# Patient Record
Sex: MALE | Race: WHITE | URBAN - METROPOLITAN AREA
[De-identification: names, ages, dates, MRNs, and addresses within clinical notes are randomized per-mention and may not be internally consistent; named-entity substitution may affect disease eponyms.]

---

## 2022-09-15 ENCOUNTER — TELEPHONE (OUTPATIENT)
Dept: ORTHOPEDIC SURGERY | Age: 52
End: 2022-09-15

## 2022-09-15 NOTE — TELEPHONE ENCOUNTER
May this patient  be  scheduled with  someone   He was getting  injections  in  [place]   3 years ago and  recently  moved to  [place] . There is  a referral in chart. No  new  films      Pt  not  sure  what he  needs  now. He  has  requested the  records  come to us  by  fax  but nothing  has come in. May  we  schedule  with  either  DIANA/or  MALGORZATA   for  workup  Or either with  [facility] / PM  without  waiting  further for  records?

## 2022-10-17 ENCOUNTER — OFFICE VISIT (OUTPATIENT)
Dept: ORTHOPEDIC SURGERY | Age: 52
End: 2022-10-17
Payer: COMMERCIAL

## 2022-10-17 DIAGNOSIS — M54.16 LUMBAR RADICULOPATHY: ICD-10-CM

## 2022-10-17 DIAGNOSIS — M47.816 SPONDYLOSIS OF LUMBAR REGION WITHOUT MYELOPATHY OR RADICULOPATHY: ICD-10-CM

## 2022-10-17 DIAGNOSIS — M54.50 LUMBAR BACK PAIN: Primary | ICD-10-CM

## 2022-10-17 PROCEDURE — 99204 OFFICE O/P NEW MOD 45 MIN: CPT | Performed by: PHYSICAL MEDICINE & REHABILITATION

## 2022-10-17 PROCEDURE — 20552 NJX 1/MLT TRIGGER POINT 1/2: CPT | Performed by: PHYSICAL MEDICINE & REHABILITATION

## 2022-10-17 RX ORDER — TRIAMCINOLONE ACETONIDE 40 MG/ML
80 INJECTION, SUSPENSION INTRA-ARTICULAR; INTRAMUSCULAR ONCE
Status: COMPLETED | OUTPATIENT
Start: 2022-10-17 | End: 2022-10-17

## 2022-10-17 RX ADMIN — TRIAMCINOLONE ACETONIDE 80 MG: 40 INJECTION, SUSPENSION INTRA-ARTICULAR; INTRAMUSCULAR at 09:17

## 2022-11-21 NOTE — PROGRESS NOTES
Date:  11/21/22     HPI:  I am seeing Karen Mike in evalution/folowup. New patient appointment total time spent with patient approximately 45 minutes, this to include review of over 21 pages of old records, full discussion of symptomatology. He has had problems for over a year, attributes some of this to increased running. We noticed lumbar spine pain with running, severe that of all of the left anterior and lateral leg area also groin pain. He underwent physical therapy with type of injection also lumbar spine exercise everything got better. He had MR imaging that revealed a disc protrusion at the L3-L4 level. He has pain in the left lower lumbar paraspinal area, currently dull. Worse with lifting things or cycling. Is better with sitting, standing, lying down and stretching. Hip flexion may help somewhat. He denies any neurologic issues in the lower extremities. Takes ibuprofen and naproxen as need be. Again he continues with his lumbar spine exercises he has been doing for about 3 years. He has had a more recent episode about 4 weeks ago that lasted several days and was a bit better if he flexed his leg. That is gotten somewhat better. When the pain was more noted was 8/10 in severity. He had trouble standing, walking, going up and down stairs. Trouble bathing, cleaning, dressing, sleeping. He did receive a spine injection in the past that he tells me offered an 80% pain reduction for over 6 months. No past medical history on file. No past surgical history on file. No family history on file.      Social History     Socioeconomic History    Marital status:      Spouse name: Not on file    Number of children: Not on file    Years of education: Not on file    Highest education level: Not on file   Occupational History    Not on file   Tobacco Use    Smoking status: Not on file    Smokeless tobacco: Not on file   Substance and Sexual Activity    Alcohol use: Not on file Drug use: Not on file    Sexual activity: Not on file   Other Topics Concern    Not on file   Social History Narrative    Not on file     Social Determinants of Health     Financial Resource Strain: Not on file   Food Insecurity: Not on file   Transportation Needs: Not on file   Physical Activity: Not on file   Stress: Not on file   Social Connections: Not on file   Intimate Partner Violence: Not on file   Housing Stability: Not on file        Review of Systems       No current outpatient medications on file. No current facility-administered medications for this visit. Not on File           PHYSICAL EXAM    General: Alert and cooperative    HEENT: Clear speech    Motor Exam: Good strength    Sensory Exam: No lateralizing findings    Deep Tendon Reflexes: Decreased reflexes in LE's    Cerebellar Exam: No ataxia in the Ue's    Extremities: Deferred    Gait / Station: Ambulates without assistance    Lumbar Spine: Tenderness to left lower lumbar paraspinal area. No significant buttock tenderness. IMPRESSION/PLAN:   Currently musculoskeletal lumbosacral spine pain. Has some radicular symptoms in the past but now the pain is more noted in the lower lumbar paraspinal area. Could represent facet joint arthropathy. I agree there is not a spine surgical issue at this time and he could benefit from a trigger point injection into the involved area. If no improvement from that, could consider a fluoroscopically guided facet joint injection. Continue his provider directed lumbar spine exercises that he has been doing for several years, it appears. On my schedule yearly follow-up for continuity of care so we can intervene as need dictates in the future. Trigger Point Injection(s):  After informed oral consent, patient was prepped per routine. The left lumbar paraspinal area was injected. 80 mg of Kenalog with 1 cc of 0.25% Marcaine injected at that site. Patient tolerated well. Band aid(s) applied. A total of one muscle/site injected today for trigger point charge.         Sarah Dial MD

## 2023-01-30 ENCOUNTER — OFFICE VISIT (OUTPATIENT)
Dept: ORTHOPEDIC SURGERY | Age: 53
End: 2023-01-30
Payer: COMMERCIAL

## 2023-01-30 DIAGNOSIS — M54.50 LUMBAR BACK PAIN: ICD-10-CM

## 2023-01-30 DIAGNOSIS — M54.16 LUMBAR RADICULOPATHY: Primary | ICD-10-CM

## 2023-01-30 DIAGNOSIS — M47.816 SPONDYLOSIS OF LUMBAR REGION WITHOUT MYELOPATHY OR RADICULOPATHY: ICD-10-CM

## 2023-01-30 PROCEDURE — 99214 OFFICE O/P EST MOD 30 MIN: CPT | Performed by: PHYSICAL MEDICINE & REHABILITATION

## 2023-01-30 NOTE — PROGRESS NOTES
Date:  01/30/23     HPI:  I am seeing Henry Israel in evalution/folowup. This visit of approximately 30 minutes. I saw him most recently just over 2 months ago. He had problems for over a year, due to possible increased exercise. He also has a cyclist.  He had pain in the left anterior and lateral leg area but also the groin. He underwent physical therapy and things got better. MR imaging of lumbar spine has revealed a disc protrusion at the L3-L4 level. I believe this was several years ago as he tells me. When I saw him last visit he was having pain in the left lower lumbar paraspinal area for which he underwent trigger point injections. Those of had some benefit and he feels that he is doing somewhat better but still has some difficulties in the morning and when it gets more noted it affects his ability to exercise and be very active during the day. He tells me that if he could cut back his level activity he would do better but that some he does not feel he should have to do and I would tend to agree. His current pain scale gets up to 8/10. Causes trouble exercises, running, etc.  He has been doing lumbar spine exercises for years. He also has trouble bathing, cleaning, dressing, sleeping. When he received spine injections in the past 1 injection offered 80% pain reduction for over 6 months. ROS: As above    PE: Cooperative. Good strength lower extremities. No lateralizing sensory findings. Has tenderness in the lower lumbar paraspinal areas. Good range of motion of the hips. Ambulates without assistance    Assessment/Plan:  PREDOMINANTLY MUSCULOSKELETAL LUMBOSACRAL  SPINE PAIN. A lot of the symptoms I think are from left lower lumbar facet arthropathy but does have some pseudo radicular symptoms in either an L3-L4 L4-L5 distributional.  I do not think the left hip is the main problem we can always look further into that pain why does with further intervention geared toward the lumbar spine. I recommend he continue his current efforts and we will have him come in for left L3 and L4 selective nerve root blocks with trigger point injections (2 sites) in the left lower lumbar paraspinal area. We do not need to reimage but we will reevaluate as we go. If all else fails we could consider spine surgical consultation and that would prompt repeating an MRI but again nothing felt required at this time.     Arturo Morales MD

## 2023-02-14 ENCOUNTER — OFFICE VISIT (OUTPATIENT)
Dept: ORTHOPEDIC SURGERY | Age: 53
End: 2023-02-14

## 2023-02-14 DIAGNOSIS — M54.16 LUMBAR RADICULOPATHY: Primary | ICD-10-CM

## 2023-02-14 DIAGNOSIS — M54.50 LUMBAR BACK PAIN: ICD-10-CM

## 2023-02-14 RX ORDER — TRIAMCINOLONE ACETONIDE 40 MG/ML
280 INJECTION, SUSPENSION INTRA-ARTICULAR; INTRAMUSCULAR ONCE
Status: COMPLETED | OUTPATIENT
Start: 2023-02-14 | End: 2023-02-14

## 2023-02-14 RX ADMIN — TRIAMCINOLONE ACETONIDE 280 MG: 40 INJECTION, SUSPENSION INTRA-ARTICULAR; INTRAMUSCULAR at 11:01

## 2023-02-14 NOTE — PROGRESS NOTES
Date: 02/14/23   Name: Stephen Carbajal    Pre-Procedural Diagnosis:    Diagnosis Orders   1. Lumbar radiculopathy  FL NERVE BLOCK LUMBOSACRAL 1ST    FL NERVE BLOCK LUMBOSACRAL EACH ADD    triamcinolone acetonide (KENALOG-40) injection 280 mg      2. Lumbar back pain  INJECT TRIGGER POINT, 1 OR 2    triamcinolone acetonide (KENALOG-40) injection 280 mg          Procedure: Selective Nerve Root Blocks (Transforaminal) - Multiple Level with lumbar trigger point injections    Precautions:  [facility] Precautions spine injections: None. Patient denies any prior sensitivity to steroid, local anesthetic, contrast dye, iodine or shellfish. The procedure was discussed at length with the patient and informed consent was signed. The patient was placed in a prone position on the fluoroscopy table and the skin was prepped and draped in a routine sterile fashion. The areas to be injected were each anesthetized with approximately 5 cc of 1% Lidocaine. A 22-gauge 3.5 inch inch spinal needle was carefully advanced under fluoroscopic guidance to the left L3 transforaminal space and subsequently the left  L4 transforaminal space. At this time 0.25 cc of omnipaque administered. . Once proper placement was confirmed, 2 cc of 0.25% Marcaine and 80 mg of Kenalog were injected through the spinal needle at each site. After informed oral consent, patient was prepped per routine. The left lower lumbar paraspinal area (2 sites) were injected. 60 mg of Kenalog with 1 cc of 0.25% Marcaine injected at each site. Patient tolerated well. Band aid(s) applied. Fluoroscopic guidance was used intermittently over a 10-minute period to insure proper needle placement and patient safety. A hard copy of the fluoroscopic  images has been placed in the patient's chart. The patient was monitored after the procedure and discharged home in stable fashion. A total of two muscles/sites injected today for trigger point charge.     Resume Meds:    N/A    L MENDOZA Tigist Shell MD  02/14/23

## 2023-03-21 ENCOUNTER — HOSPITAL ENCOUNTER (OUTPATIENT)
Dept: CT IMAGING | Age: 53
Discharge: HOME | End: 2023-03-24

## 2023-03-21 DIAGNOSIS — Z13.6 SCREENING FOR ISCHEMIC HEART DISEASE: ICD-10-CM

## 2023-03-21 PROCEDURE — 75571 CT HRT W/O DYE W/CA TEST: CPT

## 2023-11-02 ENCOUNTER — OFFICE VISIT (OUTPATIENT)
Dept: ORTHOPEDIC SURGERY | Age: 53
End: 2023-11-02
Payer: COMMERCIAL

## 2023-11-02 DIAGNOSIS — M54.16 LUMBAR RADICULOPATHY: ICD-10-CM

## 2023-11-02 DIAGNOSIS — M54.50 LUMBAR BACK PAIN: ICD-10-CM

## 2023-11-02 DIAGNOSIS — M47.816 SPONDYLOSIS OF LUMBAR REGION WITHOUT MYELOPATHY OR RADICULOPATHY: Primary | ICD-10-CM

## 2023-11-02 PROCEDURE — 99214 OFFICE O/P EST MOD 30 MIN: CPT | Performed by: PHYSICAL MEDICINE & REHABILITATION

## 2023-11-02 NOTE — PROGRESS NOTES
symptomatology. His radicular symptoms into the left leg are doing pretty well right now nothing further needs to be done, least with regards to selective nerve root blocks. 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 This is a longstanding issue for which nothing really can be done for it and we will just follow this along. I think he could benefit from bilateral lumbar facet joint injections that could offer therapeutic diagnostic benefit. If he does well with those that could raise the possibility of pursuing an RFA procedure.          Lamar Hooper MD

## 2023-11-16 ENCOUNTER — OFFICE VISIT (OUTPATIENT)
Dept: ORTHOPEDIC SURGERY | Age: 53
End: 2023-11-16

## 2023-11-16 DIAGNOSIS — M47.816 SPONDYLOSIS OF LUMBAR REGION WITHOUT MYELOPATHY OR RADICULOPATHY: Primary | ICD-10-CM

## 2023-11-16 RX ORDER — TRIAMCINOLONE ACETONIDE 40 MG/ML
200 INJECTION, SUSPENSION INTRA-ARTICULAR; INTRAMUSCULAR ONCE
Status: COMPLETED | OUTPATIENT
Start: 2023-11-16 | End: 2023-11-16

## 2023-11-16 RX ADMIN — TRIAMCINOLONE ACETONIDE 200 MG: 40 INJECTION, SUSPENSION INTRA-ARTICULAR; INTRAMUSCULAR at 13:42

## 2023-11-16 NOTE — PROGRESS NOTES
Date: 11/16/23   Name: Heather Scott    Pre-Procedural Diagnosis:    Diagnosis Orders   1. Spondylosis of lumbar region without myelopathy or radiculopathy  FL INJ LUMB/SAC FACET SINGLE LEVEL    XR INJ FACET LUMB SACRAL 2ND LVL    triamcinolone acetonide (KENALOG-40) injection 200 mg          Procedure: Bilateral Facet Joint Injections (2 levels)    Precautions:  [facility] Precautions spine injections: None. Patient denies any prior sensitivity to steroid, local anesthetic, contrast dye, iodine or shellfish. The procedure was discussed at length with the patient and informed consent was signed. The patient was placed in a prone position on the fluoroscopy table and the skin was prepped and draped in a routine sterile fashion. The areas to be injected were each anesthetized with approximately 2-3 cc of 1% Lidocaine. Initially a 22-gauge 3.5 inch spinal needle was carefully advanced under fluoroscopic guidance to the bilateral L4-L5 and L5-S1 facet joint spaces. 1 cc of 0.25% Marcaine and 50 mg of Kenalog were injected through the spinal needle at each site. Fluoroscopic guidance was used intermittently over a 10-minute period to insure proper needle placement and patient safety. A hard copy of the fluoroscopic  images has been placed in the patient's chart. The patient was monitored after the procedure and discharged home without complication. A total of 5 cc of Kenalog were administered during this procedure.     Resume Meds:     N/A    Nigel Bradford MD  11/16/23

## 2024-01-29 ENCOUNTER — TELEPHONE (OUTPATIENT)
Dept: ORTHOPEDIC SURGERY | Age: 54
End: 2024-01-29

## 2024-01-29 NOTE — TELEPHONE ENCOUNTER
The last inj did not help him. [facility] told him if it didn't that he would refer him for a surgical consultation. He would like to go ahead with this. Please let him know who [facility] refers him to.

## 2024-01-30 NOTE — TELEPHONE ENCOUNTER
Called patient  Dr Post not in the office today. Will forward message to Dr Post and call back once Dr Post has reviewed. Patient voiced appreciation and understanding.

## 2024-02-01 DIAGNOSIS — M47.816 SPONDYLOSIS OF LUMBAR REGION WITHOUT MYELOPATHY OR RADICULOPATHY: ICD-10-CM

## 2024-02-01 DIAGNOSIS — M54.50 LUMBAR BACK PAIN: Primary | ICD-10-CM

## 2024-02-01 DIAGNOSIS — M54.16 LUMBAR RADICULOPATHY: ICD-10-CM

## 2024-02-14 ENCOUNTER — TELEPHONE (OUTPATIENT)
Dept: ORTHOPEDIC SURGERY | Age: 54
End: 2024-02-14

## 2024-02-15 ENCOUNTER — TELEPHONE (OUTPATIENT)
Dept: ORTHOPEDIC SURGERY | Age: 54
End: 2024-02-15

## 2024-02-15 NOTE — TELEPHONE ENCOUNTER
Radiology Post-Procedure Note    Pre Op Diagnosis: Abnormal LFTs    Post Op Diagnosis: Same    Procedure: Ultrasound guided liver biopsy    Procedure performed by: Carmita Barfield MD    Written Informed Consent Obtained: Yes    Specimen Removed: Yes: Two 16 gauge core biopsy of liver    Estimated Blood Loss: Minimal    Findings: Moderate sedation and local anesthesia were used.    A 16-gauge Monopty biopsy device was used to remove 2 random right hepatic lobe specimen.  This specimen was sent to pathology for further evaluation.      The patient tolerated the procedure well and there were no complications.  Please see Imaging report for further details.      Scott Marmolejo  Radiology         Reviewed MRI lumbar spine results with patient.  Clinically he has lumbosacral spine pain and is interested in spine surgical opinion to see if any other options are available.  This study does reveal facet arthropathy, disc disease, protruding disc with some moderate left lateral recess stenosis.  There is what is felt to be a 0.9 cm cyst in the right kidney, the radiology not feeling thing further need to be done to follow-up on this.  Reviewed results with patient.  I told him that he does have degenerative changes, no significant impingement or other pathology but we can definitely proceed with a spine surgical consultation to see what other options may be available to him.  With regards to the cyst in the right kidney, appears to be nothing more than that and the radiology does not feel any further testing was required.  If the patient is concerned about this I could perform an ultrasound study.  He will think about this.

## 2024-02-16 ENCOUNTER — TELEPHONE (OUTPATIENT)
Dept: ORTHOPEDIC SURGERY | Age: 54
End: 2024-02-16

## 2024-02-26 ENCOUNTER — OFFICE VISIT (OUTPATIENT)
Dept: ORTHOPEDIC SURGERY | Age: 54
End: 2024-02-26
Payer: COMMERCIAL

## 2024-02-26 VITALS — BODY MASS INDEX: 30.54 KG/M2 | WEIGHT: 238 LBS | HEIGHT: 74 IN

## 2024-02-26 DIAGNOSIS — M51.26 LUMBAR DISC HERNIATION: ICD-10-CM

## 2024-02-26 DIAGNOSIS — M48.062 SPINAL STENOSIS OF LUMBAR REGION WITH NEUROGENIC CLAUDICATION: ICD-10-CM

## 2024-02-26 DIAGNOSIS — M54.16 LUMBAR RADICULOPATHY: Primary | ICD-10-CM

## 2024-02-26 PROCEDURE — 99214 OFFICE O/P EST MOD 30 MIN: CPT | Performed by: ORTHOPAEDIC SURGERY

## 2024-02-26 RX ORDER — AMLODIPINE BESYLATE 10 MG/1
1 TABLET ORAL
COMMUNITY

## 2024-02-26 NOTE — PATIENT INSTRUCTIONS
Discharge Instructions - Spine Surgery    Wound Care and Showering  Your wound will typically be covered with a clear mesh and glue, which is waterproof sufficient for showering but not soaking in a bath. If there is some drainage or bleeding from under the glue, the area should be covered with gauze and secured with tape or Tegaderm (purchased at a pharmacy) until the drainage stops. Tegaderm is preferable since it is waterproof and can be worn in the shower.  Once the drainage stops, the outer gauze and Tegaderm dressing can be removed, while leaving the glue layer in place for 10-14 days.  Hair washing is permissible while in the shower. No tub baths, hot tubs or whirlpools until seen in the office. Once the wound is healed, the glue can be removed by dissolving with a triple-antibiotic or we can remove it at your first post operative visit.        If any of the following should occur, please call the office:  Fevers greater than 101 degrees that does not improve after tylenol use.  Increased redness or large amount of swelling around incision    Exercise  Walking and stair climbing privileges are unlimited including walking on a treadmill without an incline.  Do NOT lift  greater than 15 lbs until otherwise instructed. Avoid lifting or reaching above your head.    Sleeping  You may sleep in any comfortable position. Many patients find comfort sleeping in a recliner chair. It is normal to have difficulty sleeping for the first several weeks following your surgery. We recommend trying Benadryl, Melatonin, or Tylenol PM for help sleeping. All are over-the-counter and can be found in drugstores.    Eating  Because of the tubes in your throat while asleep during surgery, it is normal to have a sore throat and some difficulty swallowing solid foods after your surgery. This may persist for several weeks. Eating soft foods like yogurt, macaroni and mashed potatoes seem to help.    Pain  If you feel you need pain

## 2024-02-26 NOTE — PROGRESS NOTES
Name: Tacho Ramos  YOB: 1970  Gender: male  MRN: [id number]  Age: 53 y.o.      Chief Complaint:  Radiating back and leg pain    History of Present Illness:      This is a very pleasant 53 y.o. male who presents with a 5-year history of back pain with episodic radiation to the left buttock and lower extremity.  The onset of the symptoms was rather insidious although it was likely related to running.  When it first started in 2019 he did do some chiropractic care and ended up having an epidural type of injection which helped for about 3 years.  Now, over the last 1 year the symptoms have returned in the same distribution.  It usually affects the left side but occasionally it will affect the right..  He describes the quality of the pain as a deep ache in the back with intermittent radiating pain and tingling in the leg. There is pain and numbness over the L5 dermatome including the posterior thigh, lateral knee, lateral ankle and dorsal foot.. The patient denies any change in bowel or bladder function since the onset of the symptoms. The symptoms seem to be exacerbated by exercise, coughing and sneezing, and prolonged sitting.  They can also be exacerbated by prolonged standing or walking. Ultimately, it is difficult to find a comfortable position. He has not had lumbar surgery in the past.   Thus far, in addition to the original chiropractic care, he has performed physical therapy and had at least 2 more injections with Dr. Post..         Medications:       Current Outpatient Medications:     esomeprazole (NEXIUM) 20 MG delayed release capsule, Take 1 capsule by mouth every morning (before breakfast), Disp: , Rfl:     amLODIPine (NORVASC) 10 MG tablet, Take 1 tablet by mouth Every Day, Disp: , Rfl:     Allergies:    No Known Allergies      Physical Exam:     This is a well developed well nourished male adult in no acute distress.     Mood and affect are appropriate.    Oriented to person, place,

## 2024-02-27 ENCOUNTER — TELEPHONE (OUTPATIENT)
Dept: ORTHOPEDIC SURGERY | Age: 54
End: 2024-02-27

## 2024-02-27 ENCOUNTER — PREP FOR PROCEDURE (OUTPATIENT)
Dept: ORTHOPEDIC SURGERY | Age: 54
End: 2024-02-27

## 2024-02-27 DIAGNOSIS — M54.16 LUMBAR RADICULOPATHY: Primary | ICD-10-CM

## 2024-02-27 DIAGNOSIS — M48.062 SPINAL STENOSIS OF LUMBAR REGION WITH NEUROGENIC CLAUDICATION: ICD-10-CM

## 2024-02-27 DIAGNOSIS — M47.816 LUMBAR FACET ARTHROPATHY: ICD-10-CM

## 2024-02-27 DIAGNOSIS — M48.062 LUMBAR STENOSIS WITH NEUROGENIC CLAUDICATION: ICD-10-CM

## 2024-02-27 RX ORDER — ACETAMINOPHEN 325 MG/1
1000 TABLET ORAL ONCE
Status: CANCELLED | OUTPATIENT
Start: 2024-02-27 | End: 2024-02-27

## 2024-02-27 NOTE — TELEPHONE ENCOUNTER
Patient states Dr Post ask him to call back and let him know what Dr Thomas had to say so that is what he is doing.

## 2024-03-27 ENCOUNTER — CLINICAL DOCUMENTATION (OUTPATIENT)
Dept: ORTHOPEDIC SURGERY | Age: 54
End: 2024-03-27

## 2024-03-28 ENCOUNTER — HOSPITAL ENCOUNTER (OUTPATIENT)
Dept: SURGERY | Age: 54
Discharge: HOME | End: 2024-03-28
Payer: COMMERCIAL

## 2024-03-28 VITALS
RESPIRATION RATE: 16 BRPM | OXYGEN SATURATION: 97 % | WEIGHT: 238.1 LBS | SYSTOLIC BLOOD PRESSURE: 134 MMHG | HEIGHT: 74 IN | BODY MASS INDEX: 30.56 KG/M2 | DIASTOLIC BLOOD PRESSURE: 90 MMHG | HEART RATE: 63 BPM | TEMPERATURE: 97.5 F

## 2024-03-28 LAB
MRSA DNA SPEC QL NAA+PROBE: NOT DETECTED
S AUREUS CPE NOSE QL NAA+PROBE: DETECTED

## 2024-03-28 PROCEDURE — 87641 MR-STAPH DNA AMP PROBE: CPT

## 2024-03-28 RX ORDER — ACETAMINOPHEN 160 MG
2000 TABLET,DISINTEGRATING ORAL DAILY
COMMUNITY

## 2024-03-28 RX ORDER — UBIDECARENONE 50 MG
2 CAPSULE ORAL DAILY
COMMUNITY

## 2024-03-28 ASSESSMENT — PAIN DESCRIPTION - ORIENTATION: ORIENTATION: LOWER

## 2024-03-28 ASSESSMENT — PAIN SCALES - GENERAL: PAINLEVEL_OUTOF10: 4

## 2024-03-28 ASSESSMENT — PAIN DESCRIPTION - LOCATION: LOCATION: BACK

## 2024-03-28 NOTE — PERIOP NOTE
Patient verified name and     Order for consent was found in EHR and matches case posting; patient verified.     Type 1B surgery, walk-in assessment complete.    Labs per surgeon: MRSA; results pending  Labs per anesthesia protocol: none  EKG: not required    MRSA/MSSA swab collected; pharmacy to review and dose antibiotic as appropriate.     Hospital approved surgical skin cleanser and instructions given per hospital policy.    Patient provided with and instructed on educational handouts including Guide to Surgery, Pain Management, Hand Hygiene, Blood Transfusion Education, and [facility] Brochure.    Patient answered medical/surgical history questions at their best of ability. All prior to admission medications documented in [facility]. Original medication prescription bottle not visualized during patient appointment.     Patient instructed to hold all vitamins 7 days prior to surgery and NSAIDS 5 days prior to surgery, patient verbalized understanding.     Patient teach back successful and patient demonstrates knowledge of instructions.

## 2024-03-28 NOTE — PERIOP NOTE
PLEASE CONTINUE TAKING ALL PRESCRIPTION MEDICATIONS UP TO THE DAY OF SURGERY UNLESS OTHERWISE DIRECTED BELOW. You may take Tylenol, allergy,  and/or indigestion medications.     TAKE ONLY THESE MEDICATIONS ON THE DAY OF SURGERY    Amlodipine (Norvasc)  Esomeprazole (Nexium)             DISCONTINUE all vitamins and supplements & days prior to surgery. DISCONTINUE Non-Steroidal Anti-Inflammatory (NSAIDS) such as Advil and Aleve 5 days prior to surgery.     Home Medications to Hold- please continue all other medications except these.            Comments      On the day before surgery please take 2 Tylenol in the morning and then again before bed. You may use either regular or extra strength.           Please do not bring home medications with you on the day of surgery unless otherwise directed by your nurse.  If you are instructed to bring home medications, please give them to your nurse as they will be administered by the nursing staff.    If you have any questions, please call [facility] [phone].    A copy of this note was provided to the patient for reference.

## 2024-03-31 NOTE — PROGRESS NOTES
Pre-Assessment Surgery Review      Patient ID:  Tacho Ramos  [id number]  53 y.o.  1970  Surgeon: Dr. Thomas  Date of Surgery: 4/17/2024  Procedure:   Left L4-L5 hemilaminectomy   Primary Care Physician: Unknown, Provider, DO None  Specialty Physician(s):      Subjective:   Tacho Ramos is a 53 y.o. White (non-) male who presents for preoperative evaluation. This is a preoperative chart review note based on data collected by the nurse at the surgical Pre-Assessment visit.    Past Medical History:   Diagnosis Date    A-fib (HCC)     GERD (gastroesophageal reflux disease)     Hyperlipidemia     Hypertension     Sleep apnea     wears mouth guard      Past Surgical History:   Procedure Laterality Date    CARDIAC ELECTROPHYSIOLOGY STUDY AND ABLATION      COLONOSCOPY      KNEE ARTHROSCOPY       History reviewed. No pertinent family history.   Social History     Tobacco Use    Smoking status: Never    Smokeless tobacco: Never   Substance Use Topics    Alcohol use: Yes     Alcohol/week: 13.0 standard drinks of alcohol     Types: 5 Glasses of wine, 8 Cans of beer per week       Prior to Admission medications    Medication Sig Start Date End Date Taking? Authorizing Provider   Red Yeast Rice 600 MG TABS Take 2 capsules by mouth daily   Yes Nettie Carl MD   vitamin D (VITAMIN D3) 50 MCG (2000 UT) CAPS capsule Take 1 capsule by mouth daily   Yes Nettie Carl MD   amLODIPine (NORVASC) 10 MG tablet Take 1 tablet by mouth Every Day    Nettie Carl MD   esomeprazole (NEXIUM) 20 MG delayed release capsule Take 1 capsule by mouth every morning (before breakfast) 1/1/10   Nettie Carl MD     No Known Allergies       Objective:     Physical Exam:   No data found.    ECG:    No results found for this or any previous visit (from the past 4464 hour(s)).    Data Review:   Labs:   Labs reviewed    Problem List:  )  Patient Active Problem List   Diagnosis    Lumbar radiculopathy    Lumbar

## 2024-04-17 ENCOUNTER — APPOINTMENT (OUTPATIENT)
Dept: GENERAL RADIOLOGY | Age: 54
End: 2024-04-17
Attending: ORTHOPAEDIC SURGERY
Payer: COMMERCIAL

## 2024-04-17 ENCOUNTER — ANESTHESIA (OUTPATIENT)
Dept: SURGERY | Age: 54
End: 2024-04-17
Payer: COMMERCIAL

## 2024-04-17 ENCOUNTER — HOSPITAL ENCOUNTER (OUTPATIENT)
Age: 54
Setting detail: OUTPATIENT SURGERY
Discharge: HOME | End: 2024-04-17
Attending: ORTHOPAEDIC SURGERY | Admitting: ORTHOPAEDIC SURGERY
Payer: COMMERCIAL

## 2024-04-17 ENCOUNTER — ANESTHESIA EVENT (OUTPATIENT)
Dept: SURGERY | Age: 54
End: 2024-04-17
Payer: COMMERCIAL

## 2024-04-17 VITALS
SYSTOLIC BLOOD PRESSURE: 131 MMHG | DIASTOLIC BLOOD PRESSURE: 82 MMHG | HEART RATE: 57 BPM | OXYGEN SATURATION: 98 % | RESPIRATION RATE: 16 BRPM | TEMPERATURE: 98.1 F

## 2024-04-17 DIAGNOSIS — M48.062 LUMBAR STENOSIS WITH NEUROGENIC CLAUDICATION: ICD-10-CM

## 2024-04-17 DIAGNOSIS — M47.816 LUMBAR FACET ARTHROPATHY: Primary | ICD-10-CM

## 2024-04-17 DIAGNOSIS — M54.16 LUMBAR RADICULOPATHY: ICD-10-CM

## 2024-04-17 PROCEDURE — 6360000002 HC RX W HCPCS: Performed by: NURSE ANESTHETIST, CERTIFIED REGISTERED

## 2024-04-17 PROCEDURE — 2500000003 HC RX 250 WO HCPCS: Performed by: NURSE ANESTHETIST, CERTIFIED REGISTERED

## 2024-04-17 PROCEDURE — 7100000001 HC PACU RECOVERY - ADDTL 15 MIN: Performed by: ORTHOPAEDIC SURGERY

## 2024-04-17 PROCEDURE — 2580000003 HC RX 258: Performed by: ANESTHESIOLOGY

## 2024-04-17 PROCEDURE — 3600000004 HC SURGERY LEVEL 4 BASE: Performed by: ORTHOPAEDIC SURGERY

## 2024-04-17 PROCEDURE — 7100000010 HC PHASE II RECOVERY - FIRST 15 MIN: Performed by: ORTHOPAEDIC SURGERY

## 2024-04-17 PROCEDURE — 3600000014 HC SURGERY LEVEL 4 ADDTL 15MIN: Performed by: ORTHOPAEDIC SURGERY

## 2024-04-17 PROCEDURE — 3700000000 HC ANESTHESIA ATTENDED CARE: Performed by: ORTHOPAEDIC SURGERY

## 2024-04-17 PROCEDURE — 63048 LAM FACETEC &FORAMOT EA ADDL: CPT | Performed by: ORTHOPAEDIC SURGERY

## 2024-04-17 PROCEDURE — 6360000002 HC RX W HCPCS: Performed by: ORTHOPAEDIC SURGERY

## 2024-04-17 PROCEDURE — 2709999900 HC NON-CHARGEABLE SUPPLY: Performed by: ORTHOPAEDIC SURGERY

## 2024-04-17 PROCEDURE — 7100000000 HC PACU RECOVERY - FIRST 15 MIN: Performed by: ORTHOPAEDIC SURGERY

## 2024-04-17 PROCEDURE — 7100000011 HC PHASE II RECOVERY - ADDTL 15 MIN: Performed by: ORTHOPAEDIC SURGERY

## 2024-04-17 PROCEDURE — 6370000000 HC RX 637 (ALT 250 FOR IP): Performed by: ANESTHESIOLOGY

## 2024-04-17 PROCEDURE — 6370000000 HC RX 637 (ALT 250 FOR IP): Performed by: ORTHOPAEDIC SURGERY

## 2024-04-17 PROCEDURE — 63047 LAM FACETEC & FORAMOT LUMBAR: CPT | Performed by: ORTHOPAEDIC SURGERY

## 2024-04-17 PROCEDURE — 2720000010 HC SURG SUPPLY STERILE: Performed by: ORTHOPAEDIC SURGERY

## 2024-04-17 PROCEDURE — 3700000001 HC ADD 15 MINUTES (ANESTHESIA): Performed by: ORTHOPAEDIC SURGERY

## 2024-04-17 PROCEDURE — 6360000002 HC RX W HCPCS: Performed by: ANESTHESIOLOGY

## 2024-04-17 PROCEDURE — 2500000003 HC RX 250 WO HCPCS: Performed by: ORTHOPAEDIC SURGERY

## 2024-04-17 RX ORDER — DEXAMETHASONE SODIUM PHOSPHATE 10 MG/ML
INJECTION INTRAMUSCULAR; INTRAVENOUS PRN
Status: DISCONTINUED | OUTPATIENT
Start: 2024-04-17 | End: 2024-04-17 | Stop reason: SDUPTHER

## 2024-04-17 RX ORDER — FENTANYL CITRATE 50 UG/ML
100 INJECTION, SOLUTION INTRAMUSCULAR; INTRAVENOUS
Status: DISCONTINUED | OUTPATIENT
Start: 2024-04-17 | End: 2024-04-17 | Stop reason: HOSPADM

## 2024-04-17 RX ORDER — GLYCOPYRROLATE 0.2 MG/ML
INJECTION INTRAMUSCULAR; INTRAVENOUS PRN
Status: DISCONTINUED | OUTPATIENT
Start: 2024-04-17 | End: 2024-04-17 | Stop reason: SDUPTHER

## 2024-04-17 RX ORDER — BUPIVACAINE HYDROCHLORIDE AND EPINEPHRINE 5; 5 MG/ML; UG/ML
INJECTION, SOLUTION EPIDURAL; INTRACAUDAL; PERINEURAL PRN
Status: DISCONTINUED | OUTPATIENT
Start: 2024-04-17 | End: 2024-04-17 | Stop reason: ALTCHOICE

## 2024-04-17 RX ORDER — HYDROMORPHONE HYDROCHLORIDE 2 MG/ML
0.5 INJECTION, SOLUTION INTRAMUSCULAR; INTRAVENOUS; SUBCUTANEOUS EVERY 5 MIN PRN
Status: DISCONTINUED | OUTPATIENT
Start: 2024-04-17 | End: 2024-04-17 | Stop reason: HOSPADM

## 2024-04-17 RX ORDER — LIDOCAINE HYDROCHLORIDE 20 MG/ML
INJECTION, SOLUTION EPIDURAL; INFILTRATION; INTRACAUDAL; PERINEURAL PRN
Status: DISCONTINUED | OUTPATIENT
Start: 2024-04-17 | End: 2024-04-17 | Stop reason: SDUPTHER

## 2024-04-17 RX ORDER — ROCURONIUM BROMIDE 10 MG/ML
INJECTION, SOLUTION INTRAVENOUS PRN
Status: DISCONTINUED | OUTPATIENT
Start: 2024-04-17 | End: 2024-04-17 | Stop reason: SDUPTHER

## 2024-04-17 RX ORDER — OXYCODONE HYDROCHLORIDE 5 MG/1
5 TABLET ORAL EVERY 6 HOURS PRN
Qty: 20 TABLET | Refills: 0 | Status: SHIPPED | OUTPATIENT
Start: 2024-04-17 | End: 2024-04-22

## 2024-04-17 RX ORDER — KETAMINE HYDROCHLORIDE 50 MG/ML
INJECTION, SOLUTION INTRAMUSCULAR; INTRAVENOUS PRN
Status: DISCONTINUED | OUTPATIENT
Start: 2024-04-17 | End: 2024-04-17 | Stop reason: SDUPTHER

## 2024-04-17 RX ORDER — SODIUM CHLORIDE 9 MG/ML
INJECTION, SOLUTION INTRAVENOUS PRN
Status: DISCONTINUED | OUTPATIENT
Start: 2024-04-17 | End: 2024-04-17 | Stop reason: HOSPADM

## 2024-04-17 RX ORDER — LIDOCAINE HYDROCHLORIDE 10 MG/ML
1 INJECTION, SOLUTION INFILTRATION; PERINEURAL
Status: DISCONTINUED | OUTPATIENT
Start: 2024-04-17 | End: 2024-04-17 | Stop reason: HOSPADM

## 2024-04-17 RX ORDER — OXYCODONE HYDROCHLORIDE 5 MG/1
5 TABLET ORAL
Status: COMPLETED | OUTPATIENT
Start: 2024-04-17 | End: 2024-04-17

## 2024-04-17 RX ORDER — ONDANSETRON 2 MG/ML
INJECTION INTRAMUSCULAR; INTRAVENOUS PRN
Status: DISCONTINUED | OUTPATIENT
Start: 2024-04-17 | End: 2024-04-17 | Stop reason: SDUPTHER

## 2024-04-17 RX ORDER — FENTANYL CITRATE 50 UG/ML
INJECTION, SOLUTION INTRAMUSCULAR; INTRAVENOUS PRN
Status: DISCONTINUED | OUTPATIENT
Start: 2024-04-17 | End: 2024-04-17 | Stop reason: SDUPTHER

## 2024-04-17 RX ORDER — SODIUM CHLORIDE 0.9 % (FLUSH) 0.9 %
5-40 SYRINGE (ML) INJECTION EVERY 12 HOURS SCHEDULED
Status: DISCONTINUED | OUTPATIENT
Start: 2024-04-17 | End: 2024-04-17 | Stop reason: HOSPADM

## 2024-04-17 RX ORDER — PROPOFOL 10 MG/ML
INJECTION, EMULSION INTRAVENOUS PRN
Status: DISCONTINUED | OUTPATIENT
Start: 2024-04-17 | End: 2024-04-17 | Stop reason: SDUPTHER

## 2024-04-17 RX ORDER — VANCOMYCIN HYDROCHLORIDE 1 G/20ML
INJECTION, POWDER, LYOPHILIZED, FOR SOLUTION INTRAVENOUS PRN
Status: DISCONTINUED | OUTPATIENT
Start: 2024-04-17 | End: 2024-04-17 | Stop reason: ALTCHOICE

## 2024-04-17 RX ORDER — PROCHLORPERAZINE EDISYLATE 5 MG/ML
5 INJECTION INTRAMUSCULAR; INTRAVENOUS
Status: DISCONTINUED | OUTPATIENT
Start: 2024-04-17 | End: 2024-04-17 | Stop reason: HOSPADM

## 2024-04-17 RX ORDER — SODIUM CHLORIDE 0.9 % (FLUSH) 0.9 %
5-40 SYRINGE (ML) INJECTION PRN
Status: DISCONTINUED | OUTPATIENT
Start: 2024-04-17 | End: 2024-04-17 | Stop reason: HOSPADM

## 2024-04-17 RX ORDER — HYDROMORPHONE HYDROCHLORIDE 2 MG/ML
INJECTION, SOLUTION INTRAMUSCULAR; INTRAVENOUS; SUBCUTANEOUS PRN
Status: DISCONTINUED | OUTPATIENT
Start: 2024-04-17 | End: 2024-04-17 | Stop reason: SDUPTHER

## 2024-04-17 RX ORDER — ACETAMINOPHEN 500 MG
1000 TABLET ORAL ONCE
Status: COMPLETED | OUTPATIENT
Start: 2024-04-17 | End: 2024-04-17

## 2024-04-17 RX ORDER — SODIUM CHLORIDE, SODIUM LACTATE, POTASSIUM CHLORIDE, CALCIUM CHLORIDE 600; 310; 30; 20 MG/100ML; MG/100ML; MG/100ML; MG/100ML
INJECTION, SOLUTION INTRAVENOUS CONTINUOUS
Status: DISCONTINUED | OUTPATIENT
Start: 2024-04-17 | End: 2024-04-17 | Stop reason: HOSPADM

## 2024-04-17 RX ORDER — NEOSTIGMINE METHYLSULFATE 1 MG/ML
INJECTION, SOLUTION INTRAVENOUS PRN
Status: DISCONTINUED | OUTPATIENT
Start: 2024-04-17 | End: 2024-04-17 | Stop reason: SDUPTHER

## 2024-04-17 RX ORDER — NALOXONE HYDROCHLORIDE 0.4 MG/ML
INJECTION, SOLUTION INTRAMUSCULAR; INTRAVENOUS; SUBCUTANEOUS PRN
Status: DISCONTINUED | OUTPATIENT
Start: 2024-04-17 | End: 2024-04-17 | Stop reason: HOSPADM

## 2024-04-17 RX ORDER — MIDAZOLAM HYDROCHLORIDE 2 MG/2ML
2 INJECTION, SOLUTION INTRAMUSCULAR; INTRAVENOUS
Status: DISCONTINUED | OUTPATIENT
Start: 2024-04-17 | End: 2024-04-17 | Stop reason: HOSPADM

## 2024-04-17 RX ADMIN — HYDROMORPHONE HYDROCHLORIDE 0.5 MG: 2 INJECTION INTRAMUSCULAR; INTRAVENOUS; SUBCUTANEOUS at 13:01

## 2024-04-17 RX ADMIN — SODIUM CHLORIDE, SODIUM LACTATE, POTASSIUM CHLORIDE, AND CALCIUM CHLORIDE: 600; 310; 30; 20 INJECTION, SOLUTION INTRAVENOUS at 08:28

## 2024-04-17 RX ADMIN — LIDOCAINE HYDROCHLORIDE 60 MG: 20 INJECTION, SOLUTION EPIDURAL; INFILTRATION; INTRACAUDAL; PERINEURAL at 11:17

## 2024-04-17 RX ADMIN — OXYCODONE 5 MG: 5 TABLET ORAL at 13:31

## 2024-04-17 RX ADMIN — Medication 3 AMPULE: at 08:29

## 2024-04-17 RX ADMIN — GLYCOPYRROLATE 0.4 MG: 0.2 INJECTION INTRAMUSCULAR; INTRAVENOUS at 12:02

## 2024-04-17 RX ADMIN — SODIUM CHLORIDE, SODIUM LACTATE, POTASSIUM CHLORIDE, AND CALCIUM CHLORIDE: 600; 310; 30; 20 INJECTION, SOLUTION INTRAVENOUS at 12:14

## 2024-04-17 RX ADMIN — PROPOFOL 100 MG: 10 INJECTION, EMULSION INTRAVENOUS at 12:04

## 2024-04-17 RX ADMIN — FENTANYL CITRATE 100 MCG: 50 INJECTION, SOLUTION INTRAMUSCULAR; INTRAVENOUS at 11:16

## 2024-04-17 RX ADMIN — Medication 3 MG: at 12:02

## 2024-04-17 RX ADMIN — ACETAMINOPHEN 1000 MG: 500 TABLET ORAL at 08:23

## 2024-04-17 RX ADMIN — DEXAMETHASONE SODIUM PHOSPHATE 10 MG: 10 INJECTION INTRAMUSCULAR; INTRAVENOUS at 11:36

## 2024-04-17 RX ADMIN — Medication 2000 MG: at 11:29

## 2024-04-17 RX ADMIN — HYDROMORPHONE HYDROCHLORIDE 0.5 MG: 2 INJECTION INTRAMUSCULAR; INTRAVENOUS; SUBCUTANEOUS at 11:42

## 2024-04-17 RX ADMIN — ONDANSETRON 4 MG: 2 INJECTION INTRAMUSCULAR; INTRAVENOUS at 11:36

## 2024-04-17 RX ADMIN — ROCURONIUM BROMIDE 50 MG: 50 INJECTION, SOLUTION INTRAVENOUS at 11:17

## 2024-04-17 RX ADMIN — KETAMINE HYDROCHLORIDE 20 MG: 50 INJECTION, SOLUTION INTRAMUSCULAR; INTRAVENOUS at 11:17

## 2024-04-17 RX ADMIN — PROPOFOL 200 MG: 10 INJECTION, EMULSION INTRAVENOUS at 11:17

## 2024-04-17 RX ADMIN — HYDROMORPHONE HYDROCHLORIDE 0.5 MG: 2 INJECTION INTRAMUSCULAR; INTRAVENOUS; SUBCUTANEOUS at 12:45

## 2024-04-17 ASSESSMENT — PAIN DESCRIPTION - DESCRIPTORS
DESCRIPTORS: ACHING
DESCRIPTORS: ACHING;SHARP;SORE
DESCRIPTORS: ACHING;DISCOMFORT

## 2024-04-17 ASSESSMENT — PAIN DESCRIPTION - LOCATION
LOCATION: BACK

## 2024-04-17 ASSESSMENT — PAIN - FUNCTIONAL ASSESSMENT
PAIN_FUNCTIONAL_ASSESSMENT: 0-10
PAIN_FUNCTIONAL_ASSESSMENT: NONE - DENIES PAIN

## 2024-04-17 ASSESSMENT — PAIN DESCRIPTION - ORIENTATION
ORIENTATION: LOWER;MID
ORIENTATION: LOWER;MID
ORIENTATION: MID;LOWER

## 2024-04-17 ASSESSMENT — PAIN SCALES - GENERAL
PAINLEVEL_OUTOF10: 7
PAINLEVEL_OUTOF10: 8
PAINLEVEL_OUTOF10: 6

## 2024-04-17 NOTE — H&P
Chief Complaint:  Radiating back and leg pain     History of Present Illness:       This is a very pleasant 53 y.o. male who presents with a 5-year history of back pain with episodic radiation to the left buttock and lower extremity.  The onset of the symptoms was rather insidious although it was likely related to running.  When it first started in 2019 he did do some chiropractic care and ended up having an epidural type of injection which helped for about 3 years.  Now, over the last 1 year the symptoms have returned in the same distribution.  It usually affects the left side but occasionally it will affect the right..  He describes the quality of the pain as a deep ache in the back with intermittent radiating pain and tingling in the leg. There is pain and numbness over the L5 dermatome including the posterior thigh, lateral knee, lateral ankle and dorsal foot.. The patient denies any change in bowel or bladder function since the onset of the symptoms. The symptoms seem to be exacerbated by exercise, coughing and sneezing, and prolonged sitting.  They can also be exacerbated by prolonged standing or walking. Ultimately, it is difficult to find a comfortable position. He has not had lumbar surgery in the past.   Thus far, in addition to the original chiropractic care, he has performed physical therapy and had at least 2 more injections with Dr. Post..        Medications:       Current Medication      Current Outpatient Medications:     esomeprazole (NEXIUM) 20 MG delayed release capsule, Take 1 capsule by mouth every morning (before breakfast), Disp: , Rfl:     amLODIPine (NORVASC) 10 MG tablet, Take 1 tablet by mouth Every Day, Disp: , Rfl:         Allergies:     No Known Allergies        Physical Exam:      This is a well developed well nourished male adult in no acute distress.      Mood and affect are appropriate.     Oriented to person, place, and time.     Respirations are unlabored and there is no

## 2024-04-17 NOTE — ANESTHESIA PRE PROCEDURE
Department of Anesthesiology  Preprocedure Note       Name:  Tacho Ramos   Age:  53 y.o.  :  1970                                          MRN:  [id number]         Date:  2024      Surgeon: Surgeon(s):  Slade Thomas MD    Procedure: Procedure(s):  left L4-L5 hemilaminectomy    Medications prior to admission:   Prior to Admission medications    Medication Sig Start Date End Date Taking? Authorizing Provider   Red Yeast Rice 600 MG TABS Take 2 capsules by mouth daily    Nettie Carl MD   vitamin D (VITAMIN D3) 50 MCG (2000) CAPS capsule Take 1 capsule by mouth daily    Nettie Carl MD   amLODIPine (NORVASC) 10 MG tablet Take 1 tablet by mouth Every Day    Nettie Carl MD   esomeprazole (NEXIUM) 20 MG delayed release capsule Take 1 capsule by mouth every morning (before breakfast) 1/1/10   Nettie Carl MD       Current medications:    Current Facility-Administered Medications   Medication Dose Route Frequency Provider Last Rate Last Admin   • lidocaine 1 % injection 1 mL  1 mL IntraDERmal Once PRN Giovanni Ruelas MD       • fentaNYL (SUBLIMAZE) injection 100 mcg  100 mcg IntraVENous Once PRN Giovanni Ruelas MD       • lactated ringers IV soln infusion   IntraVENous Continuous Giovanni Ruelas  mL/hr at 24 0828 New Bag at 24 0828   • sodium chloride flush 0.9 % injection 5-40 mL  5-40 mL IntraVENous 2 times per day Giovanni Ruelas MD       • sodium chloride flush 0.9 % injection 5-40 mL  5-40 mL IntraVENous PRN Giovanni Ruelas MD       • 0.9 % sodium chloride infusion   IntraVENous PRN Giovanni Ruelas MD       • midazolam PF (VERSED) injection 2 mg  2 mg IntraVENous Once PRN Giovanni Ruelas MD       • ceFAZolin (ANCEF) 2000 mg in sterile water 20 mL IV syringe  2,000 mg IntraVENous On Call to OR Slade Thomas MD           Allergies:  No Known Allergies    Problem List:    Patient Active

## 2024-04-17 NOTE — OP NOTE
[facility] [id number]   [phone]    OPERATIVE REPORT    Patient ID:Tacho Ramos  [id number]  1970  53 y.o.    DATE OF SURGERY: 4/17/2024    SURGEON: Slade Bonnre MD    PREOPERATIVE DIAGNOSIS: L4 and L5 lateral recess stenosis.    POSTOPERATIVE DIAGNOSIS: L4 and L5 lateral recess stenosis.    PROCEDURE:     left L4 and L5 hemilaminectomy. (CPT 65412, 73181)      ANESTHESIA: General.    ESTIMATED BLOOD LOSS: 10 ml    POSTOPERATIVE CONDITION: Stable.    INTRAOPERATIVE COMPLICATIONS: None.    INDICATION FOR PROCEDURE: The patient has a history of low back pain with episodic radiation to the left lower extremity consistent with neurogenic claudication primarily affecting the L4 and L5 nerve root. The patient has failed an extended period of observation and conservative measures. In the outpatient setting, the risks, benefits, and potential complications of the procedure were discussed and an informed consent was obtained.    DESCRIPTION OF PROCEDURE: After adequate induction of general anesthesia, the patient was positioned prone on the Jerome spinal table. Care was taken to pad all bony prominences. Shoulders and elbows were placed in a 90-90 position. The abdomen was allowed to hang free to decrease intraabdominal pressure. The legs were flexed using the sling. The lumbar area was prepped and draped in the usual sterile fashion using a DuraPrep scrub. Preoperative antibiotics were administered. A time-out was called to confirm appropriate patient, proposed procedure, and proposed incision site. With this confirmation, an incision was created over the appropriate interspace. Dissection was carried down to the lumbodorsal fascia. The lumbodorsal fascia was released on the left side and dissection was carried down to the lamina and pars interarticularis. A 000 curette was used to elevate the ligamentum flavum at its origin on the caudal surface of

## 2024-04-17 NOTE — ANESTHESIA PROCEDURE NOTES
Airway  Date/Time: 4/17/2024 11:19 AM  Urgency: elective    Airway not difficult    General Information and Staff    Patient location during procedure: OR  Resident/CRNA: Angelina Astudillo APRN - CRNA  Performed: resident/CRNA   Performed by: Angelina Astudillo APRN - CRNA  Authorized by: Giovanni Ruelas MD      Indications and Patient Condition  Indications for airway management: anesthesia  Spontaneous Ventilation: absent  Sedation level: deep  Preoxygenated: yes  Patient position: sniffing  MILS not maintained throughout  Mask difficulty assessment: vent by bag mask + OA or adjuvant +/- NMBA    Final Airway Details  Final airway type: endotracheal airway      Successful airway: ETT  Cuffed: yes   Successful intubation technique: direct laryngoscopy  Facilitating devices/methods: intubating stylet  Endotracheal tube insertion site: oral  Blade: Shankar  Blade size: #4  ETT size (mm): 8.0  Cormack-Lehane Classification: grade I - full view of glottis  Placement verified by: chest auscultation and capnometry   Measured from: lips  Number of attempts at approach: 1  Ventilation between attempts: bag mask  Number of other approaches attempted: 0    no

## 2024-04-17 NOTE — ANESTHESIA POSTPROCEDURE EVALUATION
Department of Anesthesiology  Postprocedure Note    Patient: Tacho Ramos  MRN: [id number]  YOB: 1970  Date of evaluation: 4/17/2024    Procedure Summary       Date: 04/17/24 Room / Location: [facility] MAIN OR  / [facility] MAIN OR    Anesthesia Start: 1112 Anesthesia Stop: 1227    Procedure: left L4-L5 hemilaminectomy (Spine Lumbar) Diagnosis:       Lumbar radiculopathy      Lumbar stenosis with neurogenic claudication      Lumbar facet arthropathy      (Lumbar radiculopathy [M54.16])      (Lumbar stenosis with neurogenic claudication [M48.062])      (Lumbar facet arthropathy [M47.816])    Providers: Slade Thomas MD Responsible Provider: Giovanni Ruelas MD    Anesthesia Type: general ASA Status: 2            Anesthesia Type: No value filed.    Dru Phase I: Dru Score: 10    Dru Phase II: Dru Score: 10    Anesthesia Post Evaluation    Patient location during evaluation: PACU  Patient participation: complete - patient participated  Level of consciousness: awake and alert  Airway patency: patent  Nausea & Vomiting: no nausea and no vomiting  Cardiovascular status: hemodynamically stable  Respiratory status: acceptable, nonlabored ventilation and spontaneous ventilation  Hydration status: euvolemic  Comments: /81   Pulse 61   Temp 98.1 °F (36.7 °C) (Temporal)   Resp 16   SpO2 98%     Multimodal analgesia pain management approach  Pain management: adequate and satisfactory to patient    No notable events documented.

## 2024-05-02 ENCOUNTER — OFFICE VISIT (OUTPATIENT)
Age: 54
End: 2024-05-02

## 2024-05-02 DIAGNOSIS — Z98.890 STATUS POST LUMBAR SURGERY: Primary | ICD-10-CM

## 2024-05-02 PROCEDURE — 99024 POSTOP FOLLOW-UP VISIT: CPT | Performed by: ORTHOPAEDIC SURGERY

## 2024-05-02 NOTE — PROGRESS NOTES
Name: Tacho Ramos  YOB: 1970  Gender: male  MRN: [id number]  Age: 53 y.o.    Chief Complaint: Lumbar spine surgery follow up    History of Present Illness:      Tacho Ramos  is here for 2 week follow up of his  hemilaminectomy surgery.  He reports he is more than 50% better.  He is walking 5 miles a day.  He still has pain when prolonged sitting.  He still has some paresthesias in his leg but overall better.    There is the expected residual back stiffness.     Medications:       Current Outpatient Medications:     Red Yeast Rice 600 MG TABS, Take 2 capsules by mouth daily, Disp: , Rfl:     vitamin D (VITAMIN D3) 50 MCG (2000 UT) CAPS capsule, Take 1 capsule by mouth daily, Disp: , Rfl:     amLODIPine (NORVASC) 10 MG tablet, Take 1 tablet by mouth Every Day, Disp: , Rfl:     esomeprazole (NEXIUM) 20 MG delayed release capsule, Take 1 capsule by mouth every morning (before breakfast), Disp: , Rfl:     Allergies:    No Known Allergies      Physical Exam:      Respirations are unlabored and there is no evidence of cyanosis      Wound is healed nicely without erythema, drainage or underlying fluctuance    Gait is improved    Sensory testing reveals intact sensation to light touch and in the distribution of the L3-S1 dermatomes bilaterally.    Strength testing in the lower extremity reveals the following based on the 5 point grading scale:       HF (L2) H Ab (L5) KE (L3/4) ADF (L4) EHL (L5) A Ev (S1) APF (S1)   Right 5 5 5 5 5 5 5   Left 5 5 5 5 5 5 5       Diagnosis:      ICD-10-CM    1. Status post lumbar surgery  Z98.890           Assessment/Plan:      This patient is following the expected course following the spine surgery.  He has some mild postoperative radiculitis which is expected and is currently well-tolerated.    I encouraged him to walk as much as possible for exercise. He can also begin a stationary bike, or other low impact aerobic exercise. We will limit lifting to 10

## 2024-08-26 ENCOUNTER — OFFICE VISIT (OUTPATIENT)
Age: 54
End: 2024-08-26
Payer: COMMERCIAL

## 2024-08-26 DIAGNOSIS — Z98.890 STATUS POST LUMBAR SURGERY: Primary | ICD-10-CM

## 2024-08-26 DIAGNOSIS — M54.59 INTRACTABLE LOW BACK PAIN: ICD-10-CM

## 2024-08-26 PROCEDURE — 99213 OFFICE O/P EST LOW 20 MIN: CPT | Performed by: ORTHOPAEDIC SURGERY

## 2024-08-26 NOTE — PROGRESS NOTES
Assessment/Plan:      The patient still has some L5 paresthesias but his strength is excellent.  His gait is also normal.  His primary complaint I believe is mechanical in his low back and a combination of disc and myofascial pain.  I have recommended a course of physical therapy for core strengthening.  I also recommended some oral and topical NSAID such as Voltaren.  If he fails to have lasting relief, we could consider updating his MRI.           Electronically Signed By Slade Deshpande MD   08/26/24  9:13 AM

## 2024-12-30 ENCOUNTER — HOSPITAL ENCOUNTER (OUTPATIENT)
Dept: ULTRASOUND IMAGING | Age: 54
Discharge: HOME | End: 2025-01-02
Payer: COMMERCIAL

## 2024-12-30 DIAGNOSIS — N28.1 ACQUIRED CYST OF KIDNEY: ICD-10-CM

## 2024-12-30 PROCEDURE — 76770 US EXAM ABDO BACK WALL COMP: CPT

## 2025-04-10 ENCOUNTER — OFFICE VISIT (OUTPATIENT)
Dept: ORTHOPEDIC SURGERY | Age: 55
End: 2025-04-10
Payer: COMMERCIAL

## 2025-04-10 VITALS — WEIGHT: 228 LBS | BODY MASS INDEX: 29.26 KG/M2 | HEIGHT: 74 IN

## 2025-04-10 DIAGNOSIS — M23.92 INTERNAL DERANGEMENT OF LEFT KNEE: ICD-10-CM

## 2025-04-10 DIAGNOSIS — M25.562 LEFT KNEE PAIN, UNSPECIFIED CHRONICITY: Primary | ICD-10-CM

## 2025-04-10 DIAGNOSIS — M11.262 CHONDROCALCINOSIS OF LEFT KNEE: ICD-10-CM

## 2025-04-10 PROCEDURE — 99203 OFFICE O/P NEW LOW 30 MIN: CPT | Performed by: PHYSICIAN ASSISTANT

## 2025-05-21 ENCOUNTER — TELEPHONE (OUTPATIENT)
Dept: ORTHOPEDIC SURGERY | Age: 55
End: 2025-05-21

## 2025-05-21 NOTE — TELEPHONE ENCOUNTER
Pt  has  called  and is asking   for the MRI order to be faxed to     [facility]   Fax  [phone]

## 2025-05-23 ENCOUNTER — TELEPHONE (OUTPATIENT)
Dept: ORTHOPEDIC SURGERY | Age: 55
End: 2025-05-23

## 2025-05-27 ENCOUNTER — TELEPHONE (OUTPATIENT)
Dept: ORTHOPEDIC SURGERY | Age: 55
End: 2025-05-27

## 2025-05-27 NOTE — TELEPHONE ENCOUNTER
Called and spoke to pt to inform him of MRI denial from insurance. Pt stated he has already heard from [employer] and is confused on what is going on. Advised pt to contact [employer] to get situation cleared up and to give us a call back once he knows about his MRI status at [facility]. Pt voiced understanding.

## 2025-06-05 ENCOUNTER — TELEPHONE (OUTPATIENT)
Dept: ORTHOPEDIC SURGERY | Age: 55
End: 2025-06-05

## 2025-06-05 NOTE — TELEPHONE ENCOUNTER
Pt called and had his mri at [facility]. He was told they sent his results over. I scheduled him for 06/09

## 2025-06-09 ENCOUNTER — TELEPHONE (OUTPATIENT)
Dept: ORTHOPEDIC SURGERY | Age: 55
End: 2025-06-09

## 2025-06-09 ENCOUNTER — OFFICE VISIT (OUTPATIENT)
Dept: ORTHOPEDIC SURGERY | Age: 55
End: 2025-06-09
Payer: COMMERCIAL

## 2025-06-09 DIAGNOSIS — M23.222 DERANGEMENT OF POSTERIOR HORN OF MEDIAL MENISCUS DUE TO OLD TEAR OR INJURY, LEFT KNEE: Primary | ICD-10-CM

## 2025-06-09 PROCEDURE — 99213 OFFICE O/P EST LOW 20 MIN: CPT | Performed by: PHYSICIAN ASSISTANT

## 2025-06-09 NOTE — PROGRESS NOTES
[facility]          Patient ID:  Name: Tacho Ramos  AGE/Gender: 54 y.o. male  MRN: [id number]  : 1970    Date of Consultation:  2025          ALLERGIES: No Known Allergies     CC: Discuss MRI results    History:  The patient presents today for recheck of the left knee and discussion of MRI results.  The patient injured his knee 6 months ago and has had discomfort that he localizes to the medial aspect of the left knee.  This is at times sharp and stabbing particularly when he is hiking.  He denies any locking or catching.  He bicycles and this does not tend to bother him when he is bicycling.  He has used Voltaren gel.  He is on amlodipine will be checking with his primary care provider to see whether he could take anti-inflammatories with this.. They have no other complaints or concerns.      Review of Systems:  Pertinent items are noted in HPI.    Past Medical History Includes:   Past Medical History:   Diagnosis Date    A-fib (HCC)     GERD (gastroesophageal reflux disease)     Hyperlipidemia     Hypertension     Sleep apnea     wears mouth guard   ,   Past Surgical History:   Procedure Laterality Date    CARDIAC ELECTROPHYSIOLOGY STUDY AND ABLATION      COLONOSCOPY      KNEE ARTHROSCOPY      LAMINECTOMY N/A 2024    left L4-L5 hemilaminectomy performed by Slade Thomas MD at [facility] MAIN OR       Family History: No family history on file.     Social History:   Social History     Tobacco Use    Smoking status: Never    Smokeless tobacco: Never   Substance Use Topics    Alcohol use: Yes     Alcohol/week: 13.0 standard drinks of alcohol     Types: 5 Glasses of wine, 8 Cans of beer per week         Physical Exam:     General:  On exam the patient is a pleasant 54 y.o. male in no acute distress, A&O x 3. Ambulates without an antalgic gait.  HEENT: NC/AT, PERRL   Psych: normal mood, normal affect  Lungs: respirations even, normal breath sounds  MSK: On exam of the left

## 2025-06-12 DIAGNOSIS — M25.562 LEFT KNEE PAIN, UNSPECIFIED CHRONICITY: ICD-10-CM

## (undated) DEVICE — GLOVE SURG SZ 8 L12IN FNGR THK79MIL GRN LTX FREE

## (undated) DEVICE — SUTURE VICRYL SZ 1 L36IN ABSRB UD CTX L48MM 1/2 CIR J977H

## (undated) DEVICE — 5.0MM PRECISION ROUND

## (undated) DEVICE — SUTURE MONOCRYL STRATAFIX SPRL + SZ 4-0 L12IN ABSRB UD PS-2 SXMP1B117

## (undated) DEVICE — DRAPE MICSCP W51XL150IN FOR LEICA M680 WILD OHS

## (undated) DEVICE — FORCEPS BPLR L210MM TIP L1.5 WRK L105MM STR BAYNT INSUL DISP

## (undated) DEVICE — RUBBERBAND FASTENING W1/8XL3IN 2 PER PK

## (undated) DEVICE — POSTERIOR LAMI VANPLT-LUCAS: Brand: MEDLINE INDUSTRIES, INC.

## (undated) DEVICE — SUTURE VICRYL SZ 1 L27IN ABSRB UD L36MM CP-1 1/2 CIR REV CUT J268H

## (undated) DEVICE — ABSORBENT, WATERPROOF, BACTERIA PROOF FILM DRESSING: Brand: OPSITE POST OP 9.5X8.5CM CTN 20

## (undated) DEVICE — ADHESIVE SKIN CLOSURE WND 8.661X1.5 IN 22 CM LIQUIBAND SECUR

## (undated) DEVICE — SOLUTION IRRIG 1000ML 0.9% SOD CHL USP POUR PLAS BTL

## (undated) DEVICE — AGENT HEMOSTATIC SURGIFLOW MATRIX KIT W/THROMBIN

## (undated) DEVICE — SUTURE MONOCRYL SZ 2-0 L27IN ABSRB UD CP-1 1 L36MM 1/2 CIR REV Y266H